# Patient Record
Sex: MALE | Race: WHITE | URBAN - METROPOLITAN AREA
[De-identification: names, ages, dates, MRNs, and addresses within clinical notes are randomized per-mention and may not be internally consistent; named-entity substitution may affect disease eponyms.]

---

## 2017-06-27 ENCOUNTER — HOSPITAL ENCOUNTER (OUTPATIENT)
Dept: PHYSICAL THERAPY | Age: 52
Discharge: HOME | End: 2017-06-27
Payer: COMMERCIAL

## 2017-06-27 PROCEDURE — 97161 PT EVAL LOW COMPLEX 20 MIN: CPT

## 2017-06-27 NOTE — PROGRESS NOTES
Ambulatory/Rehab Services H2 Model Falls Risk Assessment    Risk Factor Pts. ·   Confusion/Disorientation/Impulsivity  []    4 ·   Symptomatic Depression  []   2 ·   Altered Elimination  []   1 ·   Dizziness/Vertigo  []   1 ·   Gender (Male)  [x]   1 ·   Any administered antiepileptics (anticonvulsants):  []   2 ·   Any administered benzodiazepines:  []   1 ·   Visual Impairment (specify):  []   1 ·   Portable Oxygen Use  []   1 ·   Orthostatic ? BP  []   1 ·   History of Recent Falls (within 3 mos.)  []   5     Ability to Rise from Chair (choose one) Pts. ·   Ability to rise in a single movement  [x]   0 ·   Pushes up, successful in one attempt  []   1 ·   Multiple attempts, but successful  []   3 ·   Unable to rise without assistance  []   4   Total: (5 or greater = High Risk) 1     Falls Prevention Plan:   []                Physical Limitations to Exercise (specify):   []                Mobility Assistance Device (type):   []                Exercise/Equipment Adaptation (specify):    ©2010 [facility] Patent #[id number].  Federal Law prohibits the replication, distribution or use without written permission from [facility]

## 2017-06-27 NOTE — PROGRESS NOTES
Alexandra Haines  : 1965 [facility] at [address]  [phone]   [phone]       OUTPATIENT PHYSICAL THERAPY:Initial Assessment 2017    ICD-10: Treatment Diagnosis: Gluteal Tendonitis, right (M76.01), Lumbosacral plexus disorders (G54.1), Pain in joint, hip, right (M25.551)   Precautions/Allergies:   Review of patient's allergies indicates no known allergies. Fall Risk Score: 1 (? 5 = High Risk)  MD Orders: Eval and Treat MEDICAL/REFERRING DIAGNOSIS:  Pain in right hip [M25.551]  Low back pain [M54.5]  Meralgia paresthetica, right lower limb [G57.11]   DATE OF ONSET: 2017  REFERRING PHYSICIAN: Dr. Joaquin Willett: 17     INITIAL ASSESSMENT:  Mr. Theresa Saleh presents with signs and symptoms of meralgia paraesthetica including pain and decreased sensation to the lateral right leg following a fall at work. Presents with decreased hip range of motion, decreased sensation in the distribution of the right lateral femoral cutaneous nerve, and tenderness to palpation at the hip. Due to these limitation, the patient is limited in walking without pain for greater than 75 yards and reports constant numbness to his lateral thigh. Patient requires skilled physical therapy to address above limitations and return to prior level of function. PROBLEM LIST (Impacting functional limitations):  1. Decreased ADL/Functional Activities  2. Decreased Ambulation Ability/Technique  3. Increased Pain  4. Decreased Activity Tolerance INTERVENTIONS PLANNED:  1. Cold  2. Home Exercise Program (HEP)  3. Manual Therapy  4. Neuromuscular Re-education/Strengthening  5. Range of Motion (ROM)  6. Therapeutic Exercise/Strengthening   TREATMENT PLAN:  Effective Dates: 17 TO 17.   Frequency/Duration: 2 times a week for 8 weeks  GOALS: (Goals have been discussed and agreed upon with patient.)  Short-Term Functional Goals: Time Frame: 07/19/17  1. Patient will demonstrate increased right hip internal range of motion to 10 degrees to facilitate ambulation. Discharge Goals: Time Frame: 08/22/17  1. Patient will demonstrate the ability to walk half a mile without pain or limitation. 2. Patient will report minimal to no numbness in lateral thigh. 3. Patient will demonstrate functional improvement indicated by a 68/80 score on LEFS. Rehabilitation Potential For Stated Goals: Good  Regarding [facility] [place] therapy, I certify that the treatment plan above will be carried out by a therapist or under their direction. Thank you for this referral,  Luz Maria Schulz PT     Referring Physician Signature: Fouzia, Not On File, MD              Date                      HISTORY:   History of Present Injury/Illness (Reason for Referral):  Patient presents to clinic with numbness and paresthesia in the right lateral hip and leg. The symptoms started after a fall at work in February this year. He reports that he fell into a half kneeling position on the right knee and hip. The numbness started immediately after the injury and goes from the lateral hip to just above knee. Patient reports that numbness has been constant since this time. In addition, he experienced pain in his right hip immediately after the fall. He recently visited  for the pain and numbness. He received radiograph imaging and was referred to physical therapy for meralgia paraesthetica. He reports difficulty walking for more than 75 yards, such as walking through the plant. He reports no pain at rest and 4/10 pain at worst (walking long distances). Past Medical History/Comorbidities:   Mr. Heriberto Villalobos  has a past medical history of Kidney stone. Mr. Heriberto Villalobos  has a past surgical history that includes cholecystectomy. Right torn bicep and repair.    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 Current Medications:     Alleve 2 per day   Date Last Reviewed:  06/27/2017   Number of Personal Factors/Comorbidities that affect the Plan of Care: 0: LOW COMPLEXITY   EXAMINATION:   Observation/Orthostatic Postural Assessment:            Palpation:          TTP proximal to and the right greater trochanter. Mild tenderness to anterior hip region. ROM:          Lumbar range of motion within normal limits with no increased symptoms. Right hip internal rotation: 5 degrees. Left hip internal rotation: 15 degrees. Strength:         LQS: 4+/5 in all motions, abduction 4+/5 bilaterally  Special Tests:          FADIR: reproduced symptoms in the right hip; AMY test: limited but did not reproduce symptoms on the right; SLR: negative      Neurological Screen:              Sensation: Decreased sensation to touch (moving and static) over lateral right thigh extending from the hip to the lateral epicondyle, including the lateral 1/3 hamstring and lateral half of anterior thigh. Body Structures Involved:  1. Nerves  2. Joints  3. Muscles  4. Ligaments Body Functions Affected:  1. Sensory/Pain  2. Neuromusculoskeletal  3. Movement Related Activities and Participation Affected:  1. General Tasks and Demands  2. Mobility  3. Community, Social and [place]   Number of elements (examined above) that affect the Plan of Care: 4+: HIGH COMPLEXITY   CLINICAL PRESENTATION:   Presentation: Stable and uncomplicated: LOW COMPLEXITY   CLINICAL DECISION MAKING:   Outcome Measure:    Tool Used: Lower Extremity Functional Scale (LEFS)  Score:  Initial: 59/80 Most Recent: X/80 (Date: -- )   Interpretation of Score: 20 questions each scored on a 5 point scale with 0 representing \"extreme difficulty or unable to perform\" and 4 representing \"no difficulty\". The lower the score, the greater the functional disability. 80/80 represents no disability. Minimal detectable change is 9 points. Medical Necessity:   · Patient is expected to demonstrate progress in strength and range of motion to increase independence with ambulation. Reason for Services/Other Comments:  · Patient continues to require present interventions due to patient's inability to ambulate for more than 75 yards without increased pain. .   Use of outcome tool(s) and clinical judgement create a POC that gives a: Clear prediction of patient's progress: LOW COMPLEXITY            TREATMENT:   (In addition to Assessment/Re-Assessment sessions the following treatments were rendered)  Pre-treatment Symptoms/Complaints:  Patient reports no pain at rest.   Pain: Initial:   Pain Intensity 1: 0  Post Session:  0/10   Manual Therapy (    Soft Tissue Mobilization Duration  Duration: 5 Minutes): Manual techniques to facilitate improved motion and decreased pain. · Long axis distraction    Therapeutic Exercise: (5 Minutes):  Exercises per grid below to improve mobility and strength. Required moderate visual, verbal and tactile cues to promote proper body alignment and promote proper body mechanics. Progressed resistance, range and repetitions as indicated. 06/27/17     Activity/Exercise Parameters               Parameters Parameters   Patient education Plan of care, explanation of anatomy, HEP (clam shells, hip flexor stretch)                                                        Clam shell 1 x 10     Hip Flexor Stretch 3 x 30 sec                               Treatment/Session Assessment:    · Response to Treatment:  Patient with good understanding of home program provided. · Compliance with Program/Exercises: compliant most of the time. · Recommendations/Intent for next treatment session: \"Next visit will focus on advancements to more challenging activities\".   Total Treatment Duration:  PT Patient Time In/Time Out  Time In: 0230  Time Out: 0330    Dimitris Schuster, PT

## 2017-06-29 ENCOUNTER — HOSPITAL ENCOUNTER (OUTPATIENT)
Dept: PHYSICAL THERAPY | Age: 52
Discharge: HOME | End: 2017-06-29
Payer: COMMERCIAL

## 2017-06-29 PROCEDURE — 97110 THERAPEUTIC EXERCISES: CPT

## 2017-06-29 PROCEDURE — 97140 MANUAL THERAPY 1/> REGIONS: CPT

## 2017-06-29 NOTE — PROGRESS NOTES
Mayela Layne  : 1965 [facility] at [address] [place] [address]  [phone]   [phone]       OUTPATIENT PHYSICAL THERAPY:Daily Note 2017    ICD-10: Treatment Diagnosis: Gluteal Tendonitis, right (M76.01), Lumbosacral plexus disorders (G54.1), Pain in joint, hip, right (M25.551)   Precautions/Allergies:   Review of patient's allergies indicates no known allergies. Fall Risk Score: 1 (? 5 = High Risk)  MD Orders: Eval and Treat MEDICAL/REFERRING DIAGNOSIS:  Pain in right hip [M25.551]  Low back pain [M54.5]  Meralgia paresthetica, right lower limb [G57.11]   DATE OF ONSET: 2017  REFERRING PHYSICIAN: Dr. Lee Linear: 17     INITIAL ASSESSMENT:  Mr. Santos Calhoun presents with signs and symptoms of meralgia paraesthetica including pain and decreased sensation to the lateral right leg following a fall at work. Presents with decreased hip range of motion, decreased sensation in the distribution of the right lateral femoral cutaneous nerve, and tenderness to palpation at the hip. Due to these limitation, the patient is limited in walking without pain for greater than 75 yards and reports constant numbness to his lateral thigh. Patient requires skilled physical therapy to address above limitations and return to prior level of function. PROBLEM LIST (Impacting functional limitations):  1. Decreased ADL/Functional Activities  2. Decreased Ambulation Ability/Technique  3. Increased Pain  4. Decreased Activity Tolerance INTERVENTIONS PLANNED:  1. Cold  2. Home Exercise Program (HEP)  3. Manual Therapy  4. Neuromuscular Re-education/Strengthening  5. Range of Motion (ROM)  6. Therapeutic Exercise/Strengthening   TREATMENT PLAN:  Effective Dates: 17 TO 17.   Frequency/Duration: 2 times a week for 8 weeks  GOALS: (Goals have been discussed and agreed upon with patient.)  Short-Term Functional Goals: Time Frame: 07/19/17  1. Patient will demonstrate increased right hip internal range of motion to 10 degrees to facilitate ambulation. ONGOING  Discharge Goals: Time Frame: 08/22/17  1. Patient will demonstrate the ability to walk half a mile without pain or limitation. ONGOING  2. Patient will report minimal to no numbness in lateral thigh. ONGOING  3. Patient will demonstrate functional improvement indicated by a 68/80 score on LEFS.  ONGOING  Rehabilitation Potential For Stated Goals: Good  Regarding [facility] therapy, I certify that the treatment plan above will be carried out by a therapist or under their direction. Thank you for this referral,  Lorraine Otero, MARY     Referring Physician Signature: [place], Not On File, MD              Date                      HISTORY:   History of Present Injury/Illness (Reason for Referral):  Patient presents to clinic with numbness and paresthesia in the right lateral hip and leg. The symptoms started after a fall at work in February this year. He reports that he fell into a half kneeling position on the right knee and hip. The numbness started immediately after the injury and goes from the lateral hip to just above knee. Patient reports that numbness has been constant since this time. In addition, he experienced pain in his right hip immediately after the fall. He recently visited  for the pain and numbness. He received radiograph imaging and was referred to physical therapy for meralgia paraesthetica. He reports difficulty walking for more than 75 yards, such as walking through the plant. He reports no pain at rest and 4/10 pain at worst (walking long distances). Past Medical History/Comorbidities:   Mr. Davy Gibbs  has a past medical history of Kidney stone. Mr. Davy Gibbs  has a past surgical history that includes cholecystectomy. Right torn bicep and repair.    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 Current Medications:     Alleve 2 per day   Date Last Reviewed:  06/27/2017   Number of Personal Factors/Comorbidities that affect the Plan of Care: 0: LOW COMPLEXITY   EXAMINATION:   Observation/Orthostatic Postural Assessment:            Palpation:          TTP proximal to and the right greater trochanter. Mild tenderness to anterior hip region. ROM:          Lumbar range of motion within normal limits with no increased symptoms. Right hip internal rotation: 5 degrees. Left hip internal rotation: 15 degrees. Strength:         LQS: 4+/5 in all motions, abduction 4+/5 bilaterally  Special Tests:          FADIR: reproduced symptoms in the right hip; AMY test: limited but did not reproduce symptoms on the right; SLR: negative      Neurological Screen:              Sensation: Decreased sensation to touch (moving and static) over lateral right thigh extending from the hip to the lateral epicondyle, including the lateral 1/3 hamstring and lateral half of anterior thigh. Body Structures Involved:  1. Nerves  2. Joints  3. Muscles  4. Ligaments Body Functions Affected:  1. Sensory/Pain  2. Neuromusculoskeletal  3. Movement Related Activities and Participation Affected:  1. General Tasks and Demands  2. Mobility  3. Community, Social and [place]   Number of elements (examined above) that affect the Plan of Care: 4+: HIGH COMPLEXITY   CLINICAL PRESENTATION:   Presentation: Stable and uncomplicated: LOW COMPLEXITY   CLINICAL DECISION MAKING:   Outcome Measure:    Tool Used: Lower Extremity Functional Scale (LEFS)  Score:  Initial: 59/80 Most Recent: X/80 (Date: -- )   Interpretation of Score: 20 questions each scored on a 5 point scale with 0 representing \"extreme difficulty or unable to perform\" and 4 representing \"no difficulty\". The lower the score, the greater the functional disability. 80/80 represents no disability. Minimal detectable change is 9 points. Medical Necessity:   · Patient is expected to demonstrate progress in strength and range of motion to increase independence with ambulation. Reason for Services/Other Comments:  · Patient continues to require present interventions due to patient's inability to ambulate for more than 75 yards without increased pain. .   Use of outcome tool(s) and clinical judgement create a POC that gives a: Clear prediction of patient's progress: LOW COMPLEXITY            TREATMENT:   (In addition to Assessment/Re-Assessment sessions the following treatments were rendered)  Pre-treatment Symptoms/Complaints:  Patient did not complete HEP yesterday but reports that his symptoms have stayed the same since last visit. Pain: Initial:   Pain Intensity 1: 0  P Session:  0/10   Manual Therapy (    Soft Tissue Mobilization Duration  Duration: 20 Minutes): Manual techniques to facilitate improved motion and decreased pain. · Long axis distraction  · STM to gluts, tensor fascia latae and hip flexors muscles     Therapeutic Exercise: (25 Minutes):  Exercises per grid below to improve mobility and strength. Required moderate visual, verbal and tactile cues to promote proper body alignment and promote proper body mechanics. Progressed resistance, range and repetitions as indicated.      06/27/17 6/29/17    Activity/Exercise Parameters               Parameters Parameters   Patient education Plan of care, explanation of anatomy, HEP (clam shells, hip flexor stretch)  --                                                      Clam shell 1 x 10 3 x 10    Hip Flexor Stretch 3 x 30 sec 3 x 30 sec    Nerve Glides -- Half kneeling into hip flexion/extension for lateral femoral cutaneous 20 reps    Bridges  -- 3 x 10    Nustep -- 4 min    Foam roll  -- 4 min to glut muscle      Treatment/Session Assessment:    · Response to Treatment:  Patient tolerated nerve glides with some discomfort in the anterior hip. Tolerated other exercises and manual therapy well. Will progress nerve glides to include neck flexion and extension and progress glut strengthening. · Compliance with Program/Exercises: compliant most of the time. · Recommendations/Intent for next treatment session: \"Next visit will focus on advancements to more challenging activities\".   Total Treatment Duration:  PT Patient Time In/Time Out  Time In: 0330  Time Out: 0430    Vale Storey PT

## 2017-07-06 ENCOUNTER — HOSPITAL ENCOUNTER (OUTPATIENT)
Dept: PHYSICAL THERAPY | Age: 52
Discharge: HOME | End: 2017-07-06
Payer: COMMERCIAL

## 2017-07-06 PROCEDURE — 97140 MANUAL THERAPY 1/> REGIONS: CPT

## 2017-07-06 PROCEDURE — 97110 THERAPEUTIC EXERCISES: CPT

## 2017-07-06 NOTE — PROGRESS NOTES
Carmelita Valenzuela  : 1965 [facility] [address]  [phone]   [phone]       OUTPATIENT PHYSICAL THERAPY:Daily Note 2017    ICD-10: Treatment Diagnosis: Gluteal Tendonitis, right (M76.01), Lumbosacral plexus disorders (G54.1), Pain in joint, hip, right (M25.551)   Precautions/Allergies:   Review of patient's allergies indicates no known allergies. Fall Risk Score: 1 (? 5 = High Risk)  MD Orders: Eval and Treat MEDICAL/REFERRING DIAGNOSIS:  Pain in right hip [M25.551]  Low back pain [M54.5]  Meralgia paresthetica, right lower limb [G57.11]   DATE OF ONSET: 2017  REFERRING PHYSICIAN: Dr. Turner Butter: 17     INITIAL ASSESSMENT:  Mr. Bisi Persaud presents with signs and symptoms of meralgia paraesthetica including pain and decreased sensation to the lateral right leg following a fall at work. Presents with decreased hip range of motion, decreased sensation in the distribution of the right lateral femoral cutaneous nerve, and tenderness to palpation at the hip. Due to these limitation, the patient is limited in walking without pain for greater than 75 yards and reports constant numbness to his lateral thigh. Patient requires skilled physical therapy to address above limitations and return to prior level of function. PROBLEM LIST (Impacting functional limitations):  1. Decreased ADL/Functional Activities  2. Decreased Ambulation Ability/Technique  3. Increased Pain  4. Decreased Activity Tolerance INTERVENTIONS PLANNED:  1. Cold  2. Home Exercise Program (HEP)  3. Manual Therapy  4. Neuromuscular Re-education/Strengthening  5. Range of Motion (ROM)  6. Therapeutic Exercise/Strengthening   TREATMENT PLAN:  Effective Dates: 17 TO 17.   Frequency/Duration: 2 times a week for 8 weeks  GOALS: (Goals have been discussed and agreed upon with patient.)  Short-Term Functional Goals: Time Frame: 07/19/17  1. Patient will demonstrate increased right hip internal range of motion to 10 degrees to facilitate ambulation. ONGOING  Discharge Goals: Time Frame: 08/22/17  1. Patient will demonstrate the ability to walk half a mile without pain or limitation. ONGOING  2. Patient will report minimal to no numbness in lateral thigh. ONGOING  3. Patient will demonstrate functional improvement indicated by a 68/80 score on LEFS.  ONGOING  Rehabilitation Potential For Stated Goals: Good  Regarding Sharri Holland therapy, I certify that the treatment plan above will be carried out by a therapist or under their direction. Thank you for this referral,  Quin Robles     Referring Physician Signature: Pauline Burgos, Not On File, MD              Date                      HISTORY:   History of Present Injury/Illness (Reason for Referral):  Patient presents to clinic with numbness and paresthesia in the right lateral hip and leg. The symptoms started after a fall at work in February this year. He reports that he fell into a half kneeling position on the right knee and hip. The numbness started immediately after the injury and goes from the lateral hip to just above knee. Patient reports that numbness has been constant since this time. In addition, he experienced pain in his right hip immediately after the fall. He recently visited  for the pain and numbness. He received radiograph imaging and was referred to physical therapy for meralgia paraesthetica. He reports difficulty walking for more than 75 yards, such as walking through the plant. He reports no pain at rest and 4/10 pain at worst (walking long distances). Past Medical History/Comorbidities:   Mr. Bree Ulloa  has a past medical history of Kidney stone. Mr. Bree Ulloa  has a past surgical history that includes cholecystectomy. Right torn bicep and repair.    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 Current Medications:     Alleve 2 per day   Date Last Reviewed:  06/27/2017   Number of Personal Factors/Comorbidities that affect the Plan of Care: 0: LOW COMPLEXITY   EXAMINATION:   Observation/Orthostatic Postural Assessment:            Palpation:          TTP proximal to and the right greater trochanter. Mild tenderness to anterior hip region. ROM:          Lumbar range of motion within normal limits with no increased symptoms. Right hip internal rotation: 5 degrees. Left hip internal rotation: 15 degrees. Strength:         LQS: 4+/5 in all motions, abduction 4+/5 bilaterally  Special Tests:          FADIR: reproduced symptoms in the right hip; AMY test: limited but did not reproduce symptoms on the right; SLR: negative      Neurological Screen:              Sensation: Decreased sensation to touch (moving and static) over lateral right thigh extending from the hip to the lateral epicondyle, including the lateral 1/3 hamstring and lateral half of anterior thigh. Body Structures Involved:  1. Nerves  2. Joints  3. Muscles  4. Ligaments Body Functions Affected:  1. Sensory/Pain  2. Neuromusculoskeletal  3. Movement Related Activities and Participation Affected:  1. General Tasks and Demands  2. Mobility  3. Community, Social and [place]   Number of elements (examined above) that affect the Plan of Care: 4+: HIGH COMPLEXITY   CLINICAL PRESENTATION:   Presentation: Stable and uncomplicated: LOW COMPLEXITY   CLINICAL DECISION MAKING:   Outcome Measure:    Tool Used: Lower Extremity Functional Scale (LEFS)  Score:  Initial: 59/80 Most Recent: X/80 (Date: -- )   Interpretation of Score: 20 questions each scored on a 5 point scale with 0 representing \"extreme difficulty or unable to perform\" and 4 representing \"no difficulty\". The lower the score, the greater the functional disability. 80/80 represents no disability. Minimal detectable change is 9 points. Medical Necessity:   · Patient is expected to demonstrate progress in strength and range of motion to increase independence with ambulation. Reason for Services/Other Comments:  · Patient continues to require present interventions due to patient's inability to ambulate for more than 75 yards without increased pain. .   Use of outcome tool(s) and clinical judgement create a POC that gives a: Clear prediction of patient's progress: LOW COMPLEXITY            TREATMENT:   (In addition to Assessment/Re-Assessment sessions the following treatments were rendered)  Pre-treatment Symptoms/Complaints:  Patient reports no change in his symptoms of numbness. The foam roll exercise last treatment bothered him but he was not sore the next day. He is completing his HEP with no difficulties. He feels a spot on his anterior hip that pulls when he completes his stretch but it does not cause pain. Pain: Initial:   Pain Intensity 1: 0  P Session:  0/10   Manual Therapy (    Soft Tissue Mobilization Duration  Duration: 15 Minutes): Manual techniques to facilitate improved motion and decreased pain. · Long axis distraction  · STM to gluts, tensor fascia latae and hip flexors muscles     Therapeutic Exercise: (30 Minutes):  Exercises per grid below to improve mobility and strength. Required moderate visual, verbal and tactile cues to promote proper body alignment and promote proper body mechanics. Progressed resistance, range and repetitions as indicated.      06/27/17 6/29/17 7/6/17   Activity/Exercise Parameters               Parameters Parameters   Patient education Plan of care, explanation of anatomy, HEP (clam shells, hip flexor stretch)  --                                                   --   Clam shell 1 x 10 3 x 10 --   Hip Flexor Stretch 3 x 30 sec 3 x 30 sec 2 x 30 sec Lateral femoral cutaneous nerve Glides -- Half kneeling into hip flexion/extension for lateral femoral cutaneous 20 reps Sidelying with hip extension and head flexion/extension 2 x 15; standing right leg back with left side bending with head flexion/extension 2 x 15    Bridges  -- 3 x 10 1 x 10; 2 x 10 with marches   Nustep -- 4 min 4 min    Foam roll  -- 4 min to glut muscle --   Glut Strengthening  -- -- Standing on foam pad flexion extension 3 x 10 each side. Treatment/Session Assessment:    · Response to Treatment:  Patient responded well to the modified nerve glides and he reported no anterior hip pain at the conclusion of the exercises. He completed the strengthening exercises with some muscle fatigue and soreness but no pain. Will progress nerve glides and glut strengthening next treatment. · Compliance with Program/Exercises: compliant most of the time. · Recommendations/Intent for next treatment session: \"Next visit will focus on advancements to more challenging activities\".   Total Treatment Duration:  PT Patient Time In/Time Out  Time In: 6440  Time Out: 401 [facility], [place], [place]

## 2017-07-12 ENCOUNTER — HOSPITAL ENCOUNTER (OUTPATIENT)
Dept: PHYSICAL THERAPY | Age: 52
Discharge: HOME | End: 2017-07-12
Payer: COMMERCIAL

## 2017-07-12 PROCEDURE — 97140 MANUAL THERAPY 1/> REGIONS: CPT

## 2017-07-12 PROCEDURE — 97110 THERAPEUTIC EXERCISES: CPT

## 2017-07-12 NOTE — PROGRESS NOTES
Deandre Conklin  : 1965 [facility] [address]  [phone]   [phone]       OUTPATIENT PHYSICAL THERAPY:Daily Note 2017    ICD-10: Treatment Diagnosis: Gluteal Tendonitis, right (M76.01), Lumbosacral plexus disorders (G54.1), Pain in joint, hip, right (M25.551)   Precautions/Allergies:   Review of patient's allergies indicates no known allergies. Fall Risk Score: 1 (? 5 = High Risk)  MD Orders: Eval and Treat MEDICAL/REFERRING DIAGNOSIS:  Pain in right hip [M25.551]  Low back pain [M54.5]  Meralgia paresthetica, right lower limb [G57.11]   DATE OF ONSET: 2017  REFERRING PHYSICIAN: Dr. Jose L Sher: 17     INITIAL ASSESSMENT:  Mr. Valentin Sears presents with signs and symptoms of meralgia paraesthetica including pain and decreased sensation to the lateral right leg following a fall at work. Presents with decreased hip range of motion, decreased sensation in the distribution of the right lateral femoral cutaneous nerve, and tenderness to palpation at the hip. Due to these limitation, the patient is limited in walking without pain for greater than 75 yards and reports constant numbness to his lateral thigh. Patient requires skilled physical therapy to address above limitations and return to prior level of function. PROBLEM LIST (Impacting functional limitations):  1. Decreased ADL/Functional Activities  2. Decreased Ambulation Ability/Technique  3. Increased Pain  4. Decreased Activity Tolerance INTERVENTIONS PLANNED:  1. Cold  2. Home Exercise Program (HEP)  3. Manual Therapy  4. Neuromuscular Re-education/Strengthening  5. Range of Motion (ROM)  6. Therapeutic Exercise/Strengthening   TREATMENT PLAN:  Effective Dates: 17 TO 17.   Frequency/Duration: 2 times a week for 8 weeks  GOALS: (Goals have been discussed and agreed upon with patient.)  Short-Term Functional Goals: Time Frame: 07/19/17  1. Patient will demonstrate increased right hip internal range of motion to 10 degrees to facilitate ambulation. ONGOING  Discharge Goals: Time Frame: 08/22/17  1. Patient will demonstrate the ability to walk half a mile without pain or limitation. ONGOING  2. Patient will report minimal to no numbness in lateral thigh. ONGOING  3. Patient will demonstrate functional improvement indicated by a 68/80 score on LEFS.  ONGOING  Rehabilitation Potential For Stated Goals: Good  Regarding Tia Cummings therapy, I certify that the treatment plan above will be carried out by a therapist or under their direction. Thank you for this referral,  Cristofer Doctor     Referring Physician Signature: Raphael Devi MD              Date                      HISTORY:   History of Present Injury/Illness (Reason for Referral):  Patient presents to clinic with numbness and paresthesia in the right lateral hip and leg. The symptoms started after a fall at work in February this year. He reports that he fell into a half kneeling position on the right knee and hip. The numbness started immediately after the injury and goes from the lateral hip to just above knee. Patient reports that numbness has been constant since this time. In addition, he experienced pain in his right hip immediately after the fall. He recently visited  for the pain and numbness. He received radiograph imaging and was referred to physical therapy for meralgia paraesthetica. He reports difficulty walking for more than 75 yards, such as walking through the plant. He reports no pain at rest and 4/10 pain at worst (walking long distances). Past Medical History/Comorbidities:   Mr. Jay Coelho  has a past medical history of Kidney stone. Mr. Jay Coelho  has a past surgical history that includes cholecystectomy. Right torn bicep and repair.    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 Current Medications:     Alleve 2 per day   Date Last Reviewed:  06/27/2017   Number of Personal Factors/Comorbidities that affect the Plan of Care: 0: LOW COMPLEXITY   EXAMINATION:   Observation/Orthostatic Postural Assessment:            Palpation:          TTP proximal to and the right greater trochanter. Mild tenderness to anterior hip region. ROM:          Lumbar range of motion within normal limits with no increased symptoms. Right hip internal rotation: 5 degrees. Left hip internal rotation: 15 degrees. Strength:         LQS: 4+/5 in all motions, abduction 4+/5 bilaterally  Special Tests:          FADIR: reproduced symptoms in the right hip; AMY test: limited but did not reproduce symptoms on the right; SLR: negative      Neurological Screen:              Sensation: Decreased sensation to touch (moving and static) over lateral right thigh extending from the hip to the lateral epicondyle, including the lateral 1/3 hamstring and lateral half of anterior thigh. Body Structures Involved:  1. Nerves  2. Joints  3. Muscles  4. Ligaments Body Functions Affected:  1. Sensory/Pain  2. Neuromusculoskeletal  3. Movement Related Activities and Participation Affected:  1. General Tasks and Demands  2. Mobility  3. Community, Social and [place]   Number of elements (examined above) that affect the Plan of Care: 4+: HIGH COMPLEXITY   CLINICAL PRESENTATION:   Presentation: Stable and uncomplicated: LOW COMPLEXITY   CLINICAL DECISION MAKING:   Outcome Measure:    Tool Used: Lower Extremity Functional Scale (LEFS)  Score:  Initial: 59/80 Most Recent: X/80 (Date: -- )   Interpretation of Score: 20 questions each scored on a 5 point scale with 0 representing \"extreme difficulty or unable to perform\" and 4 representing \"no difficulty\". The lower the score, the greater the functional disability. 80/80 represents no disability. Minimal detectable change is 9 points. Medical Necessity:   · Patient is expected to demonstrate progress in strength and range of motion to increase independence with ambulation. Reason for Services/Other Comments:  · Patient continues to require present interventions due to patient's inability to ambulate for more than 75 yards without increased pain. .   Use of outcome tool(s) and clinical judgement create a POC that gives a: Clear prediction of patient's progress: LOW COMPLEXITY            TREATMENT:   (In addition to Assessment/Re-Assessment sessions the following treatments were rendered)  Pre-treatment Symptoms/Complaints:  Patient reports that the pain in his hip is feeling better although there has been no change in the numbness in his right leg. He reports that he is going to see the doctor this week for the follow up visit. Pain: Initial:   Pain Intensity 1: 0  P Session:  0/10   Manual Therapy (    Soft Tissue Mobilization Duration  Duration: 15 Minutes): Manual techniques to facilitate improved motion and decreased pain. · Long axis distraction  · STM to gluts, tensor fascia latae and hip flexors muscles     Therapeutic Exercise: (30 Minutes):  Exercises per grid below to improve mobility and strength. Required moderate visual, verbal and tactile cues to promote proper body alignment and promote proper body mechanics. Progressed resistance, range and repetitions as indicated.      7/6/17 07/12/17    Activity/Exercise Parameters     Patient education -- --    Clam shell -- --    Hip Flexor Stretch 2 x 30 sec --    Lateral femoral cutaneous nerve Glides Sidelying with hip extension and head flexion/extension 2 x 15; standing right leg back with left side bending with head flexion/extension 2 x 15  Sidelying with hip extension and head flexion/extension 3 x 10; standing right leg back with left side bending with head flexion/extension 3 x 10     Bridges  1 x 10; 2 x 10 with marches 1 x 10 with red band, 2 x 10 with red band & 5 sec hold    Nustep 4 min  5 min    Foam roll  -- --    Glut Strengthening  Standing on foam pad flexion extension 3 x 10 each side. Lateral step ups with 5 sec balance 3 x 10 on right     Squats  -- 3 x 10 mini squats to table       Treatment/Session Assessment:    · Response to Treatment:  Patient responded well to nerve glides. Patient exhibits increased glut strength through increased difficulty and repetitions of exercises. · Compliance with Program/Exercises: compliant most of the time. · Recommendations/Intent for next treatment session: \"Next visit will focus on advancements to more challenging activities\".   Total Treatment Duration:  PT Patient Time In/Time Out  Time In: 1430  Time Out: 0733 MAYLIN Rodriguez  [place], [place]

## 2017-08-15 NOTE — PROGRESS NOTES
Selma Brown  : 1965 [facility] [address]  [phone]   [phone]       OUTPATIENT PHYSICAL THERAPY:Discontinuation Summary 8/15/2017    ICD-10: Treatment Diagnosis: Gluteal Tendonitis, right (M76.01), Lumbosacral plexus disorders (G54.1), Pain in joint, hip, right (M25.551)   Precautions/Allergies:   Review of patient's allergies indicates no known allergies. Fall Risk Score: 1 (? 5 = High Risk)  MD Orders: Eval and Treat MEDICAL/REFERRING DIAGNOSIS:  Pain in right hip [M25.551]  Low back pain [M54.5]  Meralgia paresthetica, right lower limb [G57.11]   DATE OF ONSET: 2017  REFERRING PHYSICIAN: Dr. Miki Benson: 17     INITIAL ASSESSMENT:  Mr. Sania Brady presents with signs and symptoms of meralgia paraesthetica including pain and decreased sensation to the lateral right leg following a fall at work. Presents with decreased hip range of motion, decreased sensation in the distribution of the right lateral femoral cutaneous nerve, and tenderness to palpation at the hip. Due to these limitation, the patient is limited in walking without pain for greater than 75 yards and reports constant numbness to his lateral thigh. Patient requires skilled physical therapy to address above limitations and return to prior level of function. 8/15/17: Pt attended 4 therapy visits and did not show up for 3 therapy visits. He did not report much change in his symptoms in the visits that he attended and was planning on following up with his referring physician but did not continue to attend therapy. He is discharged from therapy as a result. PROBLEM LIST (Impacting functional limitations):  1. Decreased ADL/Functional Activities  2. Decreased Ambulation Ability/Technique  3. Increased Pain  4. Decreased Activity Tolerance INTERVENTIONS PLANNED:  1. Cold  2. Home Exercise Program (HEP)  3.  Manual Therapy  4. Neuromuscular Re-education/Strengthening  5. Range of Motion (ROM)  6. Therapeutic Exercise/Strengthening   TREATMENT PLAN:  Effective Dates: 06/27/17 TO 08/22/17. Frequency/Duration: 2 times a week for 8 weeks  GOALS: (Goals have been discussed and agreed upon with patient.)  Short-Term Functional Goals: Time Frame: 07/19/17  1. Patient will demonstrate increased right hip internal range of motion to 10 degrees to facilitate ambulation. Did not achieve   Discharge Goals: Time Frame: 08/22/17  1. Patient will demonstrate the ability to walk half a mile without pain or limitation. Did not achieve   2. Patient will report minimal to no numbness in lateral thigh. Did not achieve   3. Patient will demonstrate functional improvement indicated by a 68/80 score on LEFS. Did not achieve   Rehabilitation Potential For Stated Goals: Good                HISTORY:   History of Present Injury/Illness (Reason for Referral):  Patient presents to clinic with numbness and paresthesia in the right lateral hip and leg. The symptoms started after a fall at work in February this year. He reports that he fell into a half kneeling position on the right knee and hip. The numbness started immediately after the injury and goes from the lateral hip to just above knee. Patient reports that numbness has been constant since this time. In addition, he experienced pain in his right hip immediately after the fall. He recently visited  for the pain and numbness. He received radiograph imaging and was referred to physical therapy for meralgia paraesthetica. He reports difficulty walking for more than 75 yards, such as walking through the plant. He reports no pain at rest and 4/10 pain at worst (walking long distances). Past Medical History/Comorbidities:   Mr. Eli Owens  has a past medical history of Kidney stone. Mr. Eli Owens  has a past surgical history that includes cholecystectomy. Right torn bicep and repair.    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 Current Medications:     Alleve 2 per day   Date Last Reviewed:  06/27/2017   Number of Personal Factors/Comorbidities that affect the Plan of Care: 0: LOW COMPLEXITY   EXAMINATION:   Observation/Orthostatic Postural Assessment:            Palpation:          TTP proximal to and the right greater trochanter. Mild tenderness to anterior hip region. ROM:          Lumbar range of motion within normal limits with no increased symptoms. Right hip internal rotation: 5 degrees. Left hip internal rotation: 15 degrees. Strength:         LQS: 4+/5 in all motions, abduction 4+/5 bilaterally  Special Tests:          FADIR: reproduced symptoms in the right hip; AMY test: limited but did not reproduce symptoms on the right; SLR: negative      Neurological Screen:              Sensation: Decreased sensation to touch (moving and static) over lateral right thigh extending from the hip to the lateral epicondyle, including the lateral 1/3 hamstring and lateral half of anterior thigh. Body Structures Involved:  1. Nerves  2. Joints  3. Muscles  4. Ligaments Body Functions Affected:  1. Sensory/Pain  2. Neuromusculoskeletal  3. Movement Related Activities and Participation Affected:  1. General Tasks and Demands  2. Mobility  3. Community, Social and [place]   Number of elements (examined above) that affect the Plan of Care: 4+: HIGH COMPLEXITY   CLINICAL PRESENTATION:   Presentation: Stable and uncomplicated: LOW COMPLEXITY   CLINICAL DECISION MAKING:   Outcome Measure:    Tool Used: Lower Extremity Functional Scale (LEFS)  Score:  Initial: 59/80 Most Recent: X/80 (Date: -- )   Interpretation of Score: 20 questions each scored on a 5 point scale with 0 representing \"extreme difficulty or unable to perform\" and 4 representing \"no difficulty\". The lower the score, the greater the functional disability. 80/80 represents no disability. Minimal detectable change is 9 points. Medical Necessity:   · Patient is expected to demonstrate progress in strength and range of motion to increase independence with ambulation. Reason for Services/Other Comments:  · Patient continues to require present interventions due to patient's inability to ambulate for more than 75 yards without increased pain.  .   Use of outcome tool(s) and clinical judgement create a POC that gives a: Clear prediction of patient's progress: LOW COMPLEXITY                    Ellyn Buerger Black, PT,

## 2021-05-06 PROBLEM — R00.0 TACHYCARDIA: Status: ACTIVE | Noted: 2021-05-06

## 2021-05-06 PROBLEM — I10 ESSENTIAL HYPERTENSION: Status: ACTIVE | Noted: 2021-05-06

## 2022-03-18 PROBLEM — I10 ESSENTIAL HYPERTENSION: Status: ACTIVE | Noted: 2021-05-06

## 2022-03-19 PROBLEM — R00.0 TACHYCARDIA: Status: ACTIVE | Noted: 2021-05-06

## 2022-07-12 RX ORDER — METOPROLOL SUCCINATE 50 MG/1
50 TABLET, EXTENDED RELEASE ORAL 2 TIMES DAILY
Qty: 90 TABLET | Refills: 0 | Status: SHIPPED | OUTPATIENT
Start: 2022-07-12 | End: 2022-08-26 | Stop reason: SDUPTHER

## 2022-07-12 NOTE — TELEPHONE ENCOUNTER
Requested Prescriptions     Signed Prescriptions Disp Refills    metoprolol succinate (TOPROL XL) 50 MG extended release tablet 90 tablet 0     Sig: Take 1 tablet by mouth 2 times daily     Authorizing Provider: Xuan Rankin     Ordering User: Ellen Arredondo

## 2022-08-25 NOTE — TELEPHONE ENCOUNTER
MEDICATION REFILL REQUEST      Name of Medication:  Metoprolol  Dose:  50 mg  Frequency:  2 po qd  Quantity:  180  Days' supply:  90      Pharmacy Name/Location:  [facility] in [facility]

## 2022-08-26 RX ORDER — METOPROLOL SUCCINATE 50 MG/1
50 TABLET, EXTENDED RELEASE ORAL 2 TIMES DAILY
Qty: 60 TABLET | Refills: 0 | Status: SHIPPED | OUTPATIENT
Start: 2022-08-26 | End: 2022-08-30 | Stop reason: SDUPTHER

## 2022-08-26 NOTE — TELEPHONE ENCOUNTER
I called pt and explained we can only send in a 30 day supply until he is seen by Dr. Maritza Schaefer. He has an apt on 8-30. Pt verbalized of understanding.

## 2022-08-30 ENCOUNTER — OFFICE VISIT (OUTPATIENT)
Dept: CARDIOLOGY CLINIC | Age: 57
End: 2022-08-30
Payer: COMMERCIAL

## 2022-08-30 VITALS
HEART RATE: 85 BPM | BODY MASS INDEX: 33.12 KG/M2 | WEIGHT: 236.6 LBS | DIASTOLIC BLOOD PRESSURE: 88 MMHG | SYSTOLIC BLOOD PRESSURE: 138 MMHG | HEIGHT: 71 IN

## 2022-08-30 DIAGNOSIS — I10 ESSENTIAL HYPERTENSION: Primary | ICD-10-CM

## 2022-08-30 DIAGNOSIS — R00.0 TACHYCARDIA: ICD-10-CM

## 2022-08-30 PROCEDURE — G8427 DOCREV CUR MEDS BY ELIG CLIN: HCPCS | Performed by: INTERNAL MEDICINE

## 2022-08-30 PROCEDURE — G8417 CALC BMI ABV UP PARAM F/U: HCPCS | Performed by: INTERNAL MEDICINE

## 2022-08-30 PROCEDURE — 3017F COLORECTAL CA SCREEN DOC REV: CPT | Performed by: INTERNAL MEDICINE

## 2022-08-30 PROCEDURE — 93000 ELECTROCARDIOGRAM COMPLETE: CPT | Performed by: INTERNAL MEDICINE

## 2022-08-30 PROCEDURE — 4004F PT TOBACCO SCREEN RCVD TLK: CPT | Performed by: INTERNAL MEDICINE

## 2022-08-30 PROCEDURE — 99212 OFFICE O/P EST SF 10 MIN: CPT | Performed by: INTERNAL MEDICINE

## 2022-08-30 RX ORDER — METOPROLOL SUCCINATE 50 MG/1
50 TABLET, EXTENDED RELEASE ORAL 2 TIMES DAILY
Qty: 180 TABLET | Refills: 3 | Status: SHIPPED | OUTPATIENT
Start: 2022-08-30

## 2022-08-30 ASSESSMENT — ENCOUNTER SYMPTOMS
ABDOMINAL PAIN: 0
EYES NEGATIVE: 1
GASTROINTESTINAL NEGATIVE: 1
RESPIRATORY NEGATIVE: 1
ALLERGIC/IMMUNOLOGIC NEGATIVE: 1
PHOTOPHOBIA: 0
CHEST TIGHTNESS: 0
BACK PAIN: 0
EYE PAIN: 0
SHORTNESS OF BREATH: 0

## 2022-08-30 NOTE — PROGRESS NOTES
[facility] CARDIOLOGY  [address], [address]  PHONE: [phone]      22    NAME:  Holland Renae  : 1965  MRN: [id number]         SUBJECTIVE:   Holland Renae is a 59 y.o. male seen for follow up of:      Chief Complaint   Patient presents with    Hypertension        Cardiac Hx (Reviewed and summarized by me):  1) Tachycardia      HPI:  Initial work-up involving labs and echocardiogram was never completed. We started him on beta-blocker last time he was seen and he is done well with it. He is here for a refill of the metoprolol. Past Medical History, Past Surgical History, Family history, Social History, and Medications were all reviewed with the patient today and updated as necessary. Current Outpatient Medications:     metoprolol succinate (TOPROL XL) 50 MG extended release tablet, Take 1 tablet by mouth 2 times daily, Disp: 180 tablet, Rfl: 3  No Known Allergies  Past Medical History:   Diagnosis Date    Kidney stone      Past Surgical History:   Procedure Laterality Date    CHOLECYSTECTOMY       Family History   Problem Relation Age of Onset    Hypertension Father      Social History     Tobacco Use    Smoking status: Former     Types: Cigarettes     Quit date: 2010     Years since quittin.6    Smokeless tobacco: Current   Substance Use Topics    Alcohol use: Not on file       ROS:  Review of Systems   Constitutional: Negative. Negative for fever. HENT:  Negative for hearing loss, nosebleeds and tinnitus. Eyes: Negative. Negative for photophobia and pain. Respiratory: Negative. Negative for chest tightness and shortness of breath. Cardiovascular: Negative. Negative for chest pain, palpitations and leg swelling. Gastrointestinal: Negative. Negative for abdominal pain. Endocrine: Negative. Negative for cold intolerance and heat intolerance. Genitourinary: Negative. Negative for dysuria. Musculoskeletal: Negative. Negative for back pain and joint swelling. Skin: Negative. Negative for rash. Allergic/Immunologic: Negative. Negative for immunocompromised state. Neurological: Negative. Negative for dizziness, syncope and light-headedness. Hematological: Negative. Does not bruise/bleed easily. Psychiatric/Behavioral: Negative. Negative for suicidal ideas. PHYSICAL EXAM:  Physical Exam  Constitutional:       General: He is not in acute distress. Appearance: He is not ill-appearing. HENT:      Head: Normocephalic and atraumatic. Nose: No congestion. Mouth/Throat:      Mouth: Mucous membranes are moist.   Eyes:      Extraocular Movements: Extraocular movements intact. Pupils: Pupils are equal, round, and reactive to light. Cardiovascular:      Rate and Rhythm: Normal rate and regular rhythm. Heart sounds: No murmur heard. No friction rub. No gallop. Pulmonary:      Effort: No respiratory distress. Breath sounds: No wheezing or rhonchi. Musculoskeletal:         General: No swelling. Cervical back: Normal range of motion. Right lower leg: No edema. Left lower leg: No edema. Skin:     General: Skin is warm and dry. Findings: No rash. Neurological:      General: No focal deficit present. Mental Status: He is oriented to person, place, and time. Psychiatric:         Mood and Affect: Mood normal.         Behavior: Behavior normal.         Judgment: Judgment normal.        /88   Pulse 85   Ht 5' 11\" (1.803 m)   Wt 236 lb 9.6 oz (107.3 kg)   BMI 33.00 kg/m²      Wt Readings from Last 10 Encounters:   08/30/22 236 lb 9.6 oz (107.3 kg)   05/06/21 236 lb 9.6 oz (107.3 kg)           Medical problems and test results were reviewed with the patient today.            No results found for: BUN, BUNPOC, IBUN  No results found for: ALEXANDRU, CREAPOC, CREA  No results found for: K, PLK, WBK, KPOCT    No results found for: CHOL, CHOLPOCT, CHOLX, CHLST, CHOLV, HDL, HDLPOC, HDLC, LDL, LDLC, VLDLC, VLDL, TGLX, TRIGL    ASSESSMENT and PLAN    ICD-10-CM    1. Essential hypertension  I10 EKG 12 lead      2. Tachycardia  R00.0 EKG 12 lead          IMPRESSION:  1) Tachycardia - continue metoprolol 50 mg BID      ALL ORDERS THIS ENCOUNTER  Orders Placed This Encounter   Procedures    EKG 12 lead        No follow-up provider specified. Thank you for allowing me to participate in this patient's care. Please call or contact me if there are any questions or concerns regarding the above.       Brandi Hayes MD  08/30/22  4:29 PM

## 2023-09-29 NOTE — TELEPHONE ENCOUNTER
Pt needs refills on his metoprolol sent to the [facility] on [address] in [facility]. Pt has yearly scheduled with Dr. Leroy Gannon on 10/27/2023.

## 2023-10-02 RX ORDER — METOPROLOL SUCCINATE 50 MG/1
50 TABLET, EXTENDED RELEASE ORAL 2 TIMES DAILY
Qty: 180 TABLET | Refills: 0 | Status: SHIPPED | OUTPATIENT
Start: 2023-10-02

## 2023-10-02 NOTE — TELEPHONE ENCOUNTER
Requested Prescriptions     Signed Prescriptions Disp Refills    metoprolol succinate (TOPROL XL) 50 MG extended release tablet 180 tablet 0     Sig: Take 1 tablet by mouth 2 times daily     Authorizing Provider: Crista Lee

## 2023-10-30 ENCOUNTER — TELEPHONE (OUTPATIENT)
Age: 58
End: 2023-10-30

## 2023-12-28 RX ORDER — METOPROLOL SUCCINATE 50 MG/1
50 TABLET, EXTENDED RELEASE ORAL 2 TIMES DAILY
Qty: 60 TABLET | Refills: 0 | Status: SHIPPED | OUTPATIENT
Start: 2023-12-28

## 2023-12-28 NOTE — TELEPHONE ENCOUNTER
Pt is scheduled to be seen on 01/16/24  Requested Prescriptions     Pending Prescriptions Disp Refills    metoprolol succinate (TOPROL XL) 50 MG extended release tablet 60 tablet 0     Sig: Take 1 tablet by mouth 2 times daily

## 2023-12-28 NOTE — TELEPHONE ENCOUNTER
Patient is OUT of his med now and needs it today Letha Dumont it was suppose to be called in a week ago Please call   [facility] in [address]

## 2024-01-23 NOTE — TELEPHONE ENCOUNTER
MEDICINE REFILL REQUEST    Pt needs a refill of the following med. Only has one tablet left.    metoprolol succinate (TOPROL XL) 50 MG extended release tablet    [facility]/pharmacy #[id number] - [place], [place] - [address] [phone] -  [phone]

## 2024-01-24 RX ORDER — METOPROLOL SUCCINATE 50 MG/1
50 TABLET, EXTENDED RELEASE ORAL 2 TIMES DAILY
Qty: 180 TABLET | Refills: 0 | Status: SHIPPED | OUTPATIENT
Start: 2024-01-24 | End: 2024-01-25 | Stop reason: SDUPTHER

## 2024-01-24 NOTE — TELEPHONE ENCOUNTER
Requested Prescriptions     Pending Prescriptions Disp Refills    metoprolol succinate (TOPROL XL) 50 MG extended release tablet 180 tablet 0     Sig: Take 1 tablet by mouth 2 times daily

## 2024-01-25 NOTE — TELEPHONE ENCOUNTER
Pt called to follow up on med refil. Looks like the encounter is closed, karie note looks like it was refilled  on 1/24 however I don't see a pharmacy. Can you take a look at it. Not sure if it went through or not as [facility] is stating they have not recieved.     Pt took his last pill today.

## 2024-01-25 NOTE — TELEPHONE ENCOUNTER
Requested Prescriptions     Pending Prescriptions Disp Refills    metoprolol succinate (TOPROL XL) 50 MG extended release tablet 180 tablet 0     Sig: Take 1 tablet by mouth 2 times daily     Pt coming in 03/2024

## 2024-01-25 NOTE — TELEPHONE ENCOUNTER
Called pt back and told him were aware of his rx request and and Dr. Arellano will send it as soon as he can.    Meningitis

## 2024-01-26 RX ORDER — METOPROLOL SUCCINATE 50 MG/1
50 TABLET, EXTENDED RELEASE ORAL 2 TIMES DAILY
Qty: 180 TABLET | Refills: 0 | Status: SHIPPED | OUTPATIENT
Start: 2024-01-26

## 2024-03-14 RX ORDER — METOPROLOL SUCCINATE 50 MG/1
50 TABLET, EXTENDED RELEASE ORAL 2 TIMES DAILY
Qty: 60 TABLET | Refills: 0 | Status: SHIPPED | OUTPATIENT
Start: 2024-03-14

## 2024-03-14 NOTE — TELEPHONE ENCOUNTER
MEDICATION REFILL REQUEST      Name of Medication:  metoprolol succinate (TOPROL XL) 50 MG extended release tablet   Dose:    Frequency:    Quantity:    Days' supply:  30      Pharmacy Name/Location:      [address]    [phone]

## 2024-04-15 RX ORDER — METOPROLOL SUCCINATE 50 MG/1
50 TABLET, EXTENDED RELEASE ORAL 2 TIMES DAILY
Qty: 180 TABLET | Refills: 3 | Status: SHIPPED | OUTPATIENT
Start: 2024-04-15

## 2024-04-15 NOTE — TELEPHONE ENCOUNTER
Pt has appt on 6/10/24  Requested Prescriptions     Pending Prescriptions Disp Refills    metoprolol succinate (TOPROL XL) 50 MG extended release tablet 180 tablet      Sig: Take 1 tablet by mouth 2 times daily     Verified rx in last OV date 8/30/22. Pharmacy confirmed. Erx as requested.

## 2024-04-26 RX ORDER — METOPROLOL SUCCINATE 50 MG/1
50 TABLET, EXTENDED RELEASE ORAL 2 TIMES DAILY
Qty: 120 TABLET | Refills: 0 | Status: SHIPPED | OUTPATIENT
Start: 2024-04-26

## 2024-04-26 NOTE — TELEPHONE ENCOUNTER
Pt been waiting for metoprolol succinate (TOPROL XL) 50 MG  RX.  Shows in the chart, but not routed to pharmacy.  Please route today to [address], [place],

## 2024-04-26 NOTE — TELEPHONE ENCOUNTER
Verified rx in last OV date 08/30/2022. Pharmacy confirmed. Erx as requested     Pt has appointment on 6/10/24

## 2024-06-10 ENCOUNTER — OFFICE VISIT (OUTPATIENT)
Age: 59
End: 2024-06-10

## 2024-06-10 VITALS
HEIGHT: 71 IN | SYSTOLIC BLOOD PRESSURE: 132 MMHG | DIASTOLIC BLOOD PRESSURE: 88 MMHG | WEIGHT: 238 LBS | BODY MASS INDEX: 33.32 KG/M2 | HEART RATE: 87 BPM

## 2024-06-10 DIAGNOSIS — R00.0 TACHYCARDIA: ICD-10-CM

## 2024-06-10 DIAGNOSIS — Z13.220 LIPID SCREENING: ICD-10-CM

## 2024-06-10 DIAGNOSIS — I10 ESSENTIAL HYPERTENSION: Primary | ICD-10-CM

## 2024-06-10 PROCEDURE — 99214 OFFICE O/P EST MOD 30 MIN: CPT | Performed by: INTERNAL MEDICINE

## 2024-06-10 PROCEDURE — 93000 ELECTROCARDIOGRAM COMPLETE: CPT | Performed by: INTERNAL MEDICINE

## 2024-06-10 PROCEDURE — 3075F SYST BP GE 130 - 139MM HG: CPT | Performed by: INTERNAL MEDICINE

## 2024-06-10 PROCEDURE — 3079F DIAST BP 80-89 MM HG: CPT | Performed by: INTERNAL MEDICINE

## 2024-06-10 RX ORDER — METOPROLOL SUCCINATE 50 MG/1
50 TABLET, EXTENDED RELEASE ORAL 2 TIMES DAILY
Qty: 180 TABLET | Refills: 3 | Status: SHIPPED | OUTPATIENT
Start: 2024-06-10

## 2024-06-10 ASSESSMENT — ENCOUNTER SYMPTOMS
BACK PAIN: 0
SHORTNESS OF BREATH: 0
CHEST TIGHTNESS: 0
EYE PAIN: 0
ABDOMINAL PAIN: 0
PHOTOPHOBIA: 0
GASTROINTESTINAL NEGATIVE: 1
RESPIRATORY NEGATIVE: 1
ALLERGIC/IMMUNOLOGIC NEGATIVE: 1
EYES NEGATIVE: 1

## 2024-06-10 NOTE — PROGRESS NOTES
problems and test results were reviewed with the patient today.           No results found for: \"BUN\", \"BUNPOC\", \"IBUN\"  No results found for: \"ALEXANDRU\", \"CREAPOC\"  No results found for: \"K\", \"PLK\", \"WBK\", \"KPOCT\"    No results found for: \"CHOL\", \"CHOLPOCT\", \"CHLST\", \"CHOLV\", \"HDL\", \"HDLPOC\", \"HDLC\", \"LDL\", \"VLDLC\", \"VLDL\"    ASSESSMENT and PLAN    ICD-10-CM    1. Essential hypertension  I10 EKG 12 lead      2. Tachycardia  R00.0 EKG 12 lead     CBC     Basic Metabolic Panel     TSH     Echo (TTE) complete (PRN contrast/bubble/strain/3D)      3. Lipid screening  Z13.220 Lipid Panel          IMPRESSION:  1) Tachycardia - continue metoprolol 50 mg BID.  I plan on checking a CBC BMP, TSH.  Check echo  2) Will do lipid screening with lab work  3) Recommended that he contact PCP to get uptodate on his age appropriate cancer screenings and other general health issues.      ALL ORDERS THIS ENCOUNTER  Orders Placed This Encounter    CBC     Standing Status:   Future     Standing Expiration Date:   6/10/2025    Basic Metabolic Panel     Standing Status:   Future     Standing Expiration Date:   6/10/2025    TSH     Standing Status:   Future     Standing Expiration Date:   6/10/2025    Lipid Panel     Standing Status:   Future     Standing Expiration Date:   6/10/2025    EKG 12 lead     Order Specific Question:   Reason for Exam?     Answer:   Other    metoprolol succinate (TOPROL XL) 50 MG extended release tablet     Sig: Take 1 tablet by mouth 2 times daily     Dispense:  180 tablet     Refill:  3        Follow up in 12 months.     Thank you for allowing me to participate in this patient's care.  Please call or contact me if there are any questions or concerns regarding the above.      Romeo Arellano MD  06/10/24  10:15 AM

## 2025-06-25 RX ORDER — METOPROLOL SUCCINATE 50 MG/1
50 TABLET, EXTENDED RELEASE ORAL 2 TIMES DAILY
Qty: 180 TABLET | Refills: 0 | Status: SHIPPED | OUTPATIENT
Start: 2025-06-25

## 2025-06-25 NOTE — TELEPHONE ENCOUNTER
Last office visit 6/10/2024.  Patient scheduled for 8/7/2025.  Prescription verified in last office note.      Requested Prescriptions     Pending Prescriptions Disp Refills    metoprolol succinate (TOPROL XL) 50 MG extended release tablet 180 tablet 0     Sig: Take 1 tablet by mouth 2 times daily

## 2025-06-25 NOTE — TELEPHONE ENCOUNTER
MEDICATION REFILL REQUEST      Name of Medication:  Metoprolol   Dose:  50 mg  Frequency:  twice a day   Quantity:    Days' supply:  90 day       Pharmacy Name/Location:  [facility]